# Patient Record
Sex: MALE | Race: OTHER | NOT HISPANIC OR LATINO | URBAN - METROPOLITAN AREA
[De-identification: names, ages, dates, MRNs, and addresses within clinical notes are randomized per-mention and may not be internally consistent; named-entity substitution may affect disease eponyms.]

---

## 2024-08-02 ENCOUNTER — EMERGENCY (EMERGENCY)
Facility: HOSPITAL | Age: 34
LOS: 0 days | Discharge: HOME | End: 2024-08-02
Attending: EMERGENCY MEDICINE
Payer: COMMERCIAL

## 2024-08-02 VITALS
DIASTOLIC BLOOD PRESSURE: 75 MMHG | HEART RATE: 91 BPM | RESPIRATION RATE: 18 BRPM | SYSTOLIC BLOOD PRESSURE: 125 MMHG | OXYGEN SATURATION: 98 %

## 2024-08-02 VITALS
SYSTOLIC BLOOD PRESSURE: 152 MMHG | DIASTOLIC BLOOD PRESSURE: 97 MMHG | RESPIRATION RATE: 20 BRPM | HEART RATE: 104 BPM | OXYGEN SATURATION: 100 % | WEIGHT: 201.06 LBS | TEMPERATURE: 99 F

## 2024-08-02 DIAGNOSIS — R42 DIZZINESS AND GIDDINESS: ICD-10-CM

## 2024-08-02 DIAGNOSIS — M25.572 PAIN IN LEFT ANKLE AND JOINTS OF LEFT FOOT: ICD-10-CM

## 2024-08-02 DIAGNOSIS — S20.419A ABRASION OF UNSPECIFIED BACK WALL OF THORAX, INITIAL ENCOUNTER: ICD-10-CM
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
## 2024-08-02 DIAGNOSIS — R55 SYNCOPE AND COLLAPSE: ICD-10-CM

## 2024-08-02 DIAGNOSIS — R00.0 TACHYCARDIA, UNSPECIFIED: ICD-10-CM

## 2024-08-02 DIAGNOSIS — M54.2 CERVICALGIA: ICD-10-CM
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
## 2024-08-02 LAB
ALBUMIN SERPL ELPH-MCNC: 4.6 G/DL — SIGNIFICANT CHANGE UP (ref 3.5–5.2)
ALP SERPL-CCNC: 80 U/L — SIGNIFICANT CHANGE UP (ref 30–115)
ALT FLD-CCNC: 23 U/L — SIGNIFICANT CHANGE UP (ref 0–41)
ANION GAP SERPL CALC-SCNC: 9 MMOL/L — SIGNIFICANT CHANGE UP (ref 7–14)
AST SERPL-CCNC: 20 U/L — SIGNIFICANT CHANGE UP (ref 0–41)
BASOPHILS # BLD AUTO: 0.05 K/UL — SIGNIFICANT CHANGE UP (ref 0–0.2)
BASOPHILS NFR BLD AUTO: 0.6 % — SIGNIFICANT CHANGE UP (ref 0–1)
BILIRUB SERPL-MCNC: 0.4 MG/DL — SIGNIFICANT CHANGE UP (ref 0.2–1.2)
BUN SERPL-MCNC: 14 MG/DL — SIGNIFICANT CHANGE UP (ref 10–20)
CALCIUM SERPL-MCNC: 9.2 MG/DL — SIGNIFICANT CHANGE UP (ref 8.4–10.5)
CHLORIDE SERPL-SCNC: 102 MMOL/L — SIGNIFICANT CHANGE UP (ref 98–110)
CO2 SERPL-SCNC: 28 MMOL/L — SIGNIFICANT CHANGE UP (ref 17–32)
CREAT SERPL-MCNC: 1.2 MG/DL — SIGNIFICANT CHANGE UP (ref 0.7–1.5)
EGFR: 80 ML/MIN/1.73M2 — SIGNIFICANT CHANGE UP
EOSINOPHIL # BLD AUTO: 0.07 K/UL — SIGNIFICANT CHANGE UP (ref 0–0.7)
EOSINOPHIL NFR BLD AUTO: 0.9 % — SIGNIFICANT CHANGE UP (ref 0–8)
GLUCOSE SERPL-MCNC: 114 MG/DL — HIGH (ref 70–99)
HCT VFR BLD CALC: 46.2 % — SIGNIFICANT CHANGE UP (ref 42–52)
HGB BLD-MCNC: 16.2 G/DL — SIGNIFICANT CHANGE UP (ref 14–18)
IMM GRANULOCYTES NFR BLD AUTO: 0.1 % — SIGNIFICANT CHANGE UP (ref 0.1–0.3)
LYMPHOCYTES # BLD AUTO: 2.8 K/UL — SIGNIFICANT CHANGE UP (ref 1.2–3.4)
LYMPHOCYTES # BLD AUTO: 34.1 % — SIGNIFICANT CHANGE UP (ref 20.5–51.1)
MAGNESIUM SERPL-MCNC: 2 MG/DL — SIGNIFICANT CHANGE UP (ref 1.8–2.4)
MCHC RBC-ENTMCNC: 27.5 PG — SIGNIFICANT CHANGE UP (ref 27–31)
MCHC RBC-ENTMCNC: 35.1 G/DL — SIGNIFICANT CHANGE UP (ref 32–37)
MCV RBC AUTO: 78.4 FL — LOW (ref 80–94)
MONOCYTES # BLD AUTO: 0.53 K/UL — SIGNIFICANT CHANGE UP (ref 0.1–0.6)
MONOCYTES NFR BLD AUTO: 6.5 % — SIGNIFICANT CHANGE UP (ref 1.7–9.3)
NEUTROPHILS # BLD AUTO: 4.75 K/UL — SIGNIFICANT CHANGE UP (ref 1.4–6.5)
NEUTROPHILS NFR BLD AUTO: 57.8 % — SIGNIFICANT CHANGE UP (ref 42.2–75.2)
NRBC # BLD: 0 /100 WBCS — SIGNIFICANT CHANGE UP (ref 0–0)
PLATELET # BLD AUTO: 291 K/UL — SIGNIFICANT CHANGE UP (ref 130–400)
PMV BLD: 9 FL — SIGNIFICANT CHANGE UP (ref 7.4–10.4)
POTASSIUM SERPL-MCNC: 4.3 MMOL/L — SIGNIFICANT CHANGE UP (ref 3.5–5)
POTASSIUM SERPL-SCNC: 4.3 MMOL/L — SIGNIFICANT CHANGE UP (ref 3.5–5)
PROT SERPL-MCNC: 6.7 G/DL — SIGNIFICANT CHANGE UP (ref 6–8)
RBC # BLD: 5.89 M/UL — SIGNIFICANT CHANGE UP (ref 4.7–6.1)
RBC # FLD: 13.5 % — SIGNIFICANT CHANGE UP (ref 11.5–14.5)
SODIUM SERPL-SCNC: 139 MMOL/L — SIGNIFICANT CHANGE UP (ref 135–146)
TROPONIN T, HIGH SENSITIVITY RESULT: <6 NG/L — SIGNIFICANT CHANGE UP (ref 6–21)
WBC # BLD: 8.21 K/UL — SIGNIFICANT CHANGE UP (ref 4.8–10.8)
WBC # FLD AUTO: 8.21 K/UL — SIGNIFICANT CHANGE UP (ref 4.8–10.8)

## 2024-08-02 PROCEDURE — 99285 EMERGENCY DEPT VISIT HI MDM: CPT | Mod: 25

## 2024-08-02 PROCEDURE — 71260 CT THORAX DX C+: CPT | Mod: MC

## 2024-08-02 PROCEDURE — 80053 COMPREHEN METABOLIC PANEL: CPT

## 2024-08-02 PROCEDURE — 70450 CT HEAD/BRAIN W/O DYE: CPT | Mod: MC

## 2024-08-02 PROCEDURE — 83735 ASSAY OF MAGNESIUM: CPT

## 2024-08-02 PROCEDURE — 73620 X-RAY EXAM OF FOOT: CPT | Mod: 26,LT

## 2024-08-02 PROCEDURE — 72125 CT NECK SPINE W/O DYE: CPT | Mod: 26,MC

## 2024-08-02 PROCEDURE — 82962 GLUCOSE BLOOD TEST: CPT

## 2024-08-02 PROCEDURE — 71045 X-RAY EXAM CHEST 1 VIEW: CPT

## 2024-08-02 PROCEDURE — 72125 CT NECK SPINE W/O DYE: CPT | Mod: MC

## 2024-08-02 PROCEDURE — 36000 PLACE NEEDLE IN VEIN: CPT

## 2024-08-02 PROCEDURE — 71045 X-RAY EXAM CHEST 1 VIEW: CPT | Mod: 26

## 2024-08-02 PROCEDURE — 93005 ELECTROCARDIOGRAM TRACING: CPT

## 2024-08-02 PROCEDURE — 74177 CT ABD & PELVIS W/CONTRAST: CPT | Mod: MC

## 2024-08-02 PROCEDURE — 71260 CT THORAX DX C+: CPT | Mod: 26,MC

## 2024-08-02 PROCEDURE — 93010 ELECTROCARDIOGRAM REPORT: CPT

## 2024-08-02 PROCEDURE — 73620 X-RAY EXAM OF FOOT: CPT | Mod: LT

## 2024-08-02 PROCEDURE — 99285 EMERGENCY DEPT VISIT HI MDM: CPT

## 2024-08-02 PROCEDURE — 85025 COMPLETE CBC W/AUTO DIFF WBC: CPT

## 2024-08-02 PROCEDURE — 70450 CT HEAD/BRAIN W/O DYE: CPT | Mod: 26,MC

## 2024-08-02 PROCEDURE — 74177 CT ABD & PELVIS W/CONTRAST: CPT | Mod: 26,MC

## 2024-08-02 PROCEDURE — 84484 ASSAY OF TROPONIN QUANT: CPT

## 2024-08-02 PROCEDURE — 36415 COLL VENOUS BLD VENIPUNCTURE: CPT

## 2024-08-02 RX ORDER — DEXTROSE MONOHYDRATE AND SODIUM CHLORIDE 5; .3 G/100ML; G/100ML
1000 INJECTION, SOLUTION INTRAVENOUS ONCE
Refills: 0 | Status: COMPLETED | OUTPATIENT
Start: 2024-08-02 | End: 2024-08-02

## 2024-08-02 RX ADMIN — DEXTROSE MONOHYDRATE AND SODIUM CHLORIDE 1000 MILLILITER(S): 5; .3 INJECTION, SOLUTION INTRAVENOUS at 19:45

## 2024-08-02 NOTE — ED PROVIDER NOTE - CLINICAL SUMMARY MEDICAL DECISION MAKING FREE TEXT BOX
34-year-old male, on Ozempic, had a syncopal episode and fell down a flight of steps.  Feels dehydrated.  GCS 15.  Labs unremarkable.  EKG sinus tach no acute changes.  Chest x-ray, left foot x-ray negative.  Pan CT unremarkable.  Given IV fluids with improvement.  Will DC to follow-up with PCP, cardiology.

## 2024-08-02 NOTE — ED PROVIDER NOTE - PATIENT PORTAL LINK FT
You can access the FollowMyHealth Patient Portal offered by [facility] by registering at the following website: [email or web address]. By joining [employer]’s FollowMyHealth portal, you will also be able to view your health information using other applications (apps) compatible with our system.

## 2024-08-02 NOTE — ED PROVIDER NOTE - OBJECTIVE STATEMENT
Patient c/o passing out at top of steps, fell down 12 steps, C/o pain to neck , back and left foot, no chest pain , no HA, no abd pain, no N/V

## 2024-08-02 NOTE — ED PROVIDER NOTE - CARE PLAN
Principal Discharge DX:	Syncope  Secondary Diagnosis:	Fall  Secondary Diagnosis:	Multiple abrasions   1

## 2024-08-02 NOTE — ED ADULT NURSE NOTE - TEMPLATE
General Z Plasty Text: The lesion was extirpated to the level of the fat with a #15 scalpel blade.  Given the location of the defect, shape of the defect and the proximity to free margins a Z-plasty was deemed most appropriate for repair.  Using a sterile surgical marker, the appropriate transposition arms of the Z-plasty were drawn incorporating the defect and placing the expected incisions within the relaxed skin tension lines where possible.    The area thus outlined was incised deep to adipose tissue with a #15 scalpel blade.  The skin margins were undermined to an appropriate distance in all directions utilizing iris scissors.  The opposing transposition arms were then transposed into place in opposite direction and anchored with interrupted buried subcutaneous sutures.

## 2024-08-02 NOTE — ED PROVIDER NOTE - ATTENDING APP SHARED VISIT CONTRIBUTION OF CARE
34-year-old male, on Ozempic for weight loss, got lightheaded and fell down a flight of steps.  No chest pain or abdominal pain. +LOC x 1 sec.  Able to ambulate without difficulty.  Arrived with sister who drove him to the ED.  Exam shows alert patient in no distress, HEENT NCAT PERRL, neck paravertebral tenderness, lungs clear, RR S1S2, abdomen soft NT +BS, no CCE, + abrasions upper back, + erythema right forearm, +tenderness left 3rd and 4th distal metatarsal, neuro A&OX3 GCS 15 no deficits. 34-year-old male, on Ozempic for weight loss, got lightheaded and fell down a flight of steps.  No chest pain or abdominal pain. +LOC x 1 sec.  States he did not drink enough water today.  Able to ambulate without difficulty.  Arrived with sister who drove him to the ED.  Exam shows alert patient in no distress, HEENT NCAT PERRL, neck paravertebral tenderness, lungs clear, RR S1S2, abdomen soft NT +BS, no CCE, + abrasions upper back, + erythema right forearm, +tenderness left 3rd and 4th distal metatarsal, neuro A&OX3 GCS 15 no deficits.

## 2024-08-02 NOTE — ED PROVIDER NOTE - MUSCULOSKELETAL, MLM
abrasions to upper back , right arm, swelling and mild tenderness to left dorsal foot distal 3rd 4th metatarsal

## 2024-08-02 NOTE — ED ADULT NURSE NOTE - OBJECTIVE STATEMENT
Pt brought to ED for fall at home. Pt is alert awake oriented x 4. Pt states he fell at home walking down a flight of stairs. Pt unsure if he hit his head or not. Pt endorse LOC after fall. Pt denies any PMH. Pt is on Ozempic for weight loss.

## 2024-08-02 NOTE — ED PROVIDER NOTE - NSFOLLOWUPINSTRUCTIONS_ED_ALL_ED_FT
rest, motrin for pain, follow up with PMD and cardiology for further work up       Our Emergency Department Referral Coordinators will be reaching out to you in the next 24-48 hours from 9:00am to 5:00pm to schedule a follow up appointment. Please expect a phone call from the hospital in that time frame. If you do not receive a call or if you have any questions or concerns, you can reach them at   [phone].